# Patient Record
Sex: MALE | Race: WHITE | NOT HISPANIC OR LATINO | Employment: FULL TIME | URBAN - METROPOLITAN AREA
[De-identification: names, ages, dates, MRNs, and addresses within clinical notes are randomized per-mention and may not be internally consistent; named-entity substitution may affect disease eponyms.]

---

## 2019-07-17 ENCOUNTER — ANESTHESIA (INPATIENT)
Dept: PERIOP | Facility: HOSPITAL | Age: 29
DRG: 395 | End: 2019-07-17

## 2019-07-17 ENCOUNTER — APPOINTMENT (EMERGENCY)
Dept: CT IMAGING | Facility: HOSPITAL | Age: 29
DRG: 395 | End: 2019-07-17

## 2019-07-17 ENCOUNTER — ANESTHESIA EVENT (INPATIENT)
Dept: PERIOP | Facility: HOSPITAL | Age: 29
DRG: 395 | End: 2019-07-17

## 2019-07-17 ENCOUNTER — HOSPITAL ENCOUNTER (INPATIENT)
Facility: HOSPITAL | Age: 29
LOS: 1 days | Discharge: HOME | DRG: 395 | End: 2019-07-18
Attending: EMERGENCY MEDICINE | Admitting: SURGERY

## 2019-07-17 DIAGNOSIS — K61.0 PERIANAL ABSCESS: ICD-10-CM

## 2019-07-17 DIAGNOSIS — K61.1 PERIRECTAL ABSCESS: Primary | ICD-10-CM

## 2019-07-17 DIAGNOSIS — K60.4 RECTAL FISTULA: ICD-10-CM

## 2019-07-17 LAB
ANION GAP SERPL CALCULATED.3IONS-SCNC: 7 MMOL/L (ref 4–13)
APTT PPP: 33 SECONDS (ref 23–37)
BASOPHILS # BLD AUTO: 0.03 THOUSANDS/ΜL (ref 0–0.1)
BASOPHILS NFR BLD AUTO: 0 % (ref 0–1)
BUN SERPL-MCNC: 9 MG/DL (ref 5–25)
CALCIUM SERPL-MCNC: 9.2 MG/DL (ref 8.3–10.1)
CHLORIDE SERPL-SCNC: 103 MMOL/L (ref 100–108)
CO2 SERPL-SCNC: 29 MMOL/L (ref 21–32)
CREAT SERPL-MCNC: 0.9 MG/DL (ref 0.6–1.3)
EOSINOPHIL # BLD AUTO: 0.15 THOUSAND/ΜL (ref 0–0.61)
EOSINOPHIL NFR BLD AUTO: 2 % (ref 0–6)
ERYTHROCYTE [DISTWIDTH] IN BLOOD BY AUTOMATED COUNT: 11.8 % (ref 11.6–15.1)
GFR SERPL CREATININE-BSD FRML MDRD: 115 ML/MIN/1.73SQ M
GLUCOSE SERPL-MCNC: 90 MG/DL (ref 65–140)
HCT VFR BLD AUTO: 42.7 % (ref 36.5–49.3)
HGB BLD-MCNC: 14.5 G/DL (ref 12–17)
IMM GRANULOCYTES # BLD AUTO: 0.04 THOUSAND/UL (ref 0–0.2)
IMM GRANULOCYTES NFR BLD AUTO: 0 % (ref 0–2)
INR PPP: 1.07 (ref 0.84–1.19)
LYMPHOCYTES # BLD AUTO: 1.49 THOUSANDS/ΜL (ref 0.6–4.47)
LYMPHOCYTES NFR BLD AUTO: 16 % (ref 14–44)
MCH RBC QN AUTO: 31 PG (ref 26.8–34.3)
MCHC RBC AUTO-ENTMCNC: 34 G/DL (ref 31.4–37.4)
MCV RBC AUTO: 91 FL (ref 82–98)
MONOCYTES # BLD AUTO: 0.91 THOUSAND/ΜL (ref 0.17–1.22)
MONOCYTES NFR BLD AUTO: 10 % (ref 4–12)
NEUTROPHILS # BLD AUTO: 6.58 THOUSANDS/ΜL (ref 1.85–7.62)
NEUTS SEG NFR BLD AUTO: 72 % (ref 43–75)
NRBC BLD AUTO-RTO: 0 /100 WBCS
PLATELET # BLD AUTO: 243 THOUSANDS/UL (ref 149–390)
PMV BLD AUTO: 9.5 FL (ref 8.9–12.7)
POTASSIUM SERPL-SCNC: 4.2 MMOL/L (ref 3.5–5.3)
PROTHROMBIN TIME: 13.9 SECONDS (ref 11.6–14.5)
RBC # BLD AUTO: 4.67 MILLION/UL (ref 3.88–5.62)
SODIUM SERPL-SCNC: 139 MMOL/L (ref 136–145)
WBC # BLD AUTO: 9.2 THOUSAND/UL (ref 4.31–10.16)

## 2019-07-17 PROCEDURE — 46040 I&D ISCHIORCT&/PERIRCT ABSC: CPT | Performed by: SURGERY

## 2019-07-17 PROCEDURE — 87070 CULTURE OTHR SPECIMN AEROBIC: CPT | Performed by: SURGERY

## 2019-07-17 PROCEDURE — 87176 TISSUE HOMOGENIZATION CULTR: CPT | Performed by: SURGERY

## 2019-07-17 PROCEDURE — 99222 1ST HOSP IP/OBS MODERATE 55: CPT | Performed by: SURGERY

## 2019-07-17 PROCEDURE — 87075 CULTR BACTERIA EXCEPT BLOOD: CPT | Performed by: SURGERY

## 2019-07-17 PROCEDURE — 36415 COLL VENOUS BLD VENIPUNCTURE: CPT | Performed by: EMERGENCY MEDICINE

## 2019-07-17 PROCEDURE — 80048 BASIC METABOLIC PNL TOTAL CA: CPT | Performed by: EMERGENCY MEDICINE

## 2019-07-17 PROCEDURE — 96361 HYDRATE IV INFUSION ADD-ON: CPT

## 2019-07-17 PROCEDURE — 87205 SMEAR GRAM STAIN: CPT | Performed by: SURGERY

## 2019-07-17 PROCEDURE — 85730 THROMBOPLASTIN TIME PARTIAL: CPT | Performed by: EMERGENCY MEDICINE

## 2019-07-17 PROCEDURE — 46040 I&D ISCHIORCT&/PERIRCT ABSC: CPT | Performed by: PHYSICIAN ASSISTANT

## 2019-07-17 PROCEDURE — 99284 EMERGENCY DEPT VISIT MOD MDM: CPT

## 2019-07-17 PROCEDURE — 85610 PROTHROMBIN TIME: CPT | Performed by: EMERGENCY MEDICINE

## 2019-07-17 PROCEDURE — 96374 THER/PROPH/DIAG INJ IV PUSH: CPT

## 2019-07-17 PROCEDURE — 0J993ZX DRAINAGE OF BUTTOCK SUBCUTANEOUS TISSUE AND FASCIA, PERCUTANEOUS APPROACH, DIAGNOSTIC: ICD-10-PCS | Performed by: SURGERY

## 2019-07-17 PROCEDURE — 72193 CT PELVIS W/DYE: CPT

## 2019-07-17 PROCEDURE — 99285 EMERGENCY DEPT VISIT HI MDM: CPT | Performed by: EMERGENCY MEDICINE

## 2019-07-17 PROCEDURE — 85025 COMPLETE CBC W/AUTO DIFF WBC: CPT | Performed by: EMERGENCY MEDICINE

## 2019-07-17 RX ORDER — CEFAZOLIN SODIUM 1 G/3ML
INJECTION, POWDER, FOR SOLUTION INTRAMUSCULAR; INTRAVENOUS AS NEEDED
Status: DISCONTINUED | OUTPATIENT
Start: 2019-07-17 | End: 2019-07-17 | Stop reason: SURG

## 2019-07-17 RX ORDER — CEFAZOLIN SODIUM 2 G/50ML
2000 SOLUTION INTRAVENOUS EVERY 8 HOURS
Status: DISCONTINUED | OUTPATIENT
Start: 2019-07-17 | End: 2019-07-18

## 2019-07-17 RX ORDER — HYDROMORPHONE HCL/PF 1 MG/ML
0.5 SYRINGE (ML) INJECTION
Status: DISCONTINUED | OUTPATIENT
Start: 2019-07-17 | End: 2019-07-17 | Stop reason: HOSPADM

## 2019-07-17 RX ORDER — DEXAMETHASONE SODIUM PHOSPHATE 10 MG/ML
INJECTION, SOLUTION INTRAMUSCULAR; INTRAVENOUS AS NEEDED
Status: DISCONTINUED | OUTPATIENT
Start: 2019-07-17 | End: 2019-07-17 | Stop reason: SURG

## 2019-07-17 RX ORDER — METRONIDAZOLE 500 MG/1
500 TABLET ORAL EVERY 8 HOURS SCHEDULED
Status: DISCONTINUED | OUTPATIENT
Start: 2019-07-17 | End: 2019-07-18 | Stop reason: HOSPADM

## 2019-07-17 RX ORDER — FENTANYL CITRATE 50 UG/ML
INJECTION, SOLUTION INTRAMUSCULAR; INTRAVENOUS AS NEEDED
Status: DISCONTINUED | OUTPATIENT
Start: 2019-07-17 | End: 2019-07-17 | Stop reason: SURG

## 2019-07-17 RX ORDER — MAGNESIUM HYDROXIDE 1200 MG/15ML
LIQUID ORAL AS NEEDED
Status: DISCONTINUED | OUTPATIENT
Start: 2019-07-17 | End: 2019-07-17 | Stop reason: HOSPADM

## 2019-07-17 RX ORDER — ONDANSETRON 2 MG/ML
4 INJECTION INTRAMUSCULAR; INTRAVENOUS EVERY 6 HOURS PRN
Status: DISCONTINUED | OUTPATIENT
Start: 2019-07-17 | End: 2019-07-18 | Stop reason: HOSPADM

## 2019-07-17 RX ORDER — PROPOFOL 10 MG/ML
INJECTION, EMULSION INTRAVENOUS AS NEEDED
Status: DISCONTINUED | OUTPATIENT
Start: 2019-07-17 | End: 2019-07-17 | Stop reason: SURG

## 2019-07-17 RX ORDER — DOCUSATE SODIUM 100 MG/1
100 CAPSULE, LIQUID FILLED ORAL 2 TIMES DAILY
Status: DISCONTINUED | OUTPATIENT
Start: 2019-07-17 | End: 2019-07-18 | Stop reason: HOSPADM

## 2019-07-17 RX ORDER — FENTANYL CITRATE/PF 50 MCG/ML
50 SYRINGE (ML) INJECTION
Status: DISCONTINUED | OUTPATIENT
Start: 2019-07-17 | End: 2019-07-17 | Stop reason: HOSPADM

## 2019-07-17 RX ORDER — KETOROLAC TROMETHAMINE 30 MG/ML
15 INJECTION, SOLUTION INTRAMUSCULAR; INTRAVENOUS ONCE
Status: COMPLETED | OUTPATIENT
Start: 2019-07-17 | End: 2019-07-17

## 2019-07-17 RX ORDER — ONDANSETRON 2 MG/ML
INJECTION INTRAMUSCULAR; INTRAVENOUS AS NEEDED
Status: DISCONTINUED | OUTPATIENT
Start: 2019-07-17 | End: 2019-07-17 | Stop reason: SURG

## 2019-07-17 RX ORDER — METOCLOPRAMIDE HYDROCHLORIDE 5 MG/ML
10 INJECTION INTRAMUSCULAR; INTRAVENOUS ONCE AS NEEDED
Status: DISCONTINUED | OUTPATIENT
Start: 2019-07-17 | End: 2019-07-17 | Stop reason: HOSPADM

## 2019-07-17 RX ORDER — ONDANSETRON 2 MG/ML
4 INJECTION INTRAMUSCULAR; INTRAVENOUS ONCE AS NEEDED
Status: DISCONTINUED | OUTPATIENT
Start: 2019-07-17 | End: 2019-07-17 | Stop reason: HOSPADM

## 2019-07-17 RX ORDER — ACETAMINOPHEN 325 MG/1
650 TABLET ORAL EVERY 6 HOURS PRN
Status: DISCONTINUED | OUTPATIENT
Start: 2019-07-17 | End: 2019-07-17

## 2019-07-17 RX ORDER — SODIUM CHLORIDE 9 MG/ML
50 INJECTION, SOLUTION INTRAVENOUS CONTINUOUS
Status: DISCONTINUED | OUTPATIENT
Start: 2019-07-17 | End: 2019-07-18 | Stop reason: HOSPADM

## 2019-07-17 RX ORDER — SODIUM CHLORIDE 9 MG/ML
125 INJECTION, SOLUTION INTRAVENOUS CONTINUOUS
Status: DISCONTINUED | OUTPATIENT
Start: 2019-07-17 | End: 2019-07-18

## 2019-07-17 RX ORDER — OXYCODONE HYDROCHLORIDE AND ACETAMINOPHEN 5; 325 MG/1; MG/1
2 TABLET ORAL EVERY 6 HOURS PRN
Status: DISCONTINUED | OUTPATIENT
Start: 2019-07-17 | End: 2019-07-18

## 2019-07-17 RX ORDER — MIDAZOLAM HYDROCHLORIDE 1 MG/ML
INJECTION INTRAMUSCULAR; INTRAVENOUS AS NEEDED
Status: DISCONTINUED | OUTPATIENT
Start: 2019-07-17 | End: 2019-07-17 | Stop reason: SURG

## 2019-07-17 RX ORDER — ACETAMINOPHEN 325 MG/1
650 TABLET ORAL EVERY 6 HOURS PRN
Status: DISCONTINUED | OUTPATIENT
Start: 2019-07-17 | End: 2019-07-18 | Stop reason: HOSPADM

## 2019-07-17 RX ORDER — LIDOCAINE HYDROCHLORIDE 10 MG/ML
INJECTION, SOLUTION INFILTRATION; PERINEURAL AS NEEDED
Status: DISCONTINUED | OUTPATIENT
Start: 2019-07-17 | End: 2019-07-17 | Stop reason: SURG

## 2019-07-17 RX ORDER — OXYCODONE HYDROCHLORIDE AND ACETAMINOPHEN 5; 325 MG/1; MG/1
1 TABLET ORAL EVERY 4 HOURS PRN
Status: DISCONTINUED | OUTPATIENT
Start: 2019-07-17 | End: 2019-07-18 | Stop reason: HOSPADM

## 2019-07-17 RX ORDER — OXYCODONE HYDROCHLORIDE AND ACETAMINOPHEN 5; 325 MG/1; MG/1
1 TABLET ORAL EVERY 4 HOURS PRN
Status: DISCONTINUED | OUTPATIENT
Start: 2019-07-17 | End: 2019-07-17

## 2019-07-17 RX ADMIN — Medication 500 MG: at 14:41

## 2019-07-17 RX ADMIN — FENTANYL CITRATE 50 MCG: 50 INJECTION INTRAMUSCULAR; INTRAVENOUS at 15:49

## 2019-07-17 RX ADMIN — LIDOCAINE HYDROCHLORIDE 50 MG: 10 INJECTION, SOLUTION INFILTRATION; PERINEURAL at 14:39

## 2019-07-17 RX ADMIN — ONDANSETRON 4 MG: 2 INJECTION INTRAMUSCULAR; INTRAVENOUS at 14:45

## 2019-07-17 RX ADMIN — SODIUM CHLORIDE 125 ML/HR: 0.9 INJECTION, SOLUTION INTRAVENOUS at 13:47

## 2019-07-17 RX ADMIN — CEFAZOLIN SODIUM 2000 MG: 2 SOLUTION INTRAVENOUS at 22:07

## 2019-07-17 RX ADMIN — CEFAZOLIN 2000 MG: 1 INJECTION, POWDER, FOR SOLUTION INTRAVENOUS at 14:43

## 2019-07-17 RX ADMIN — MORPHINE SULFATE 2 MG: 2 INJECTION, SOLUTION INTRAMUSCULAR; INTRAVENOUS at 13:53

## 2019-07-17 RX ADMIN — SODIUM CHLORIDE 50 ML/HR: 0.9 INJECTION, SOLUTION INTRAVENOUS at 16:00

## 2019-07-17 RX ADMIN — PROPOFOL 300 MG: 10 INJECTION, EMULSION INTRAVENOUS at 14:39

## 2019-07-17 RX ADMIN — SODIUM CHLORIDE 1000 ML: 0.9 INJECTION, SOLUTION INTRAVENOUS at 09:24

## 2019-07-17 RX ADMIN — DEXAMETHASONE SODIUM PHOSPHATE 10 MG: 10 INJECTION, SOLUTION INTRAMUSCULAR; INTRAVENOUS at 14:45

## 2019-07-17 RX ADMIN — IOHEXOL 100 ML: 350 INJECTION, SOLUTION INTRAVENOUS at 10:21

## 2019-07-17 RX ADMIN — METRONIDAZOLE 500 MG: 500 TABLET, FILM COATED ORAL at 22:07

## 2019-07-17 RX ADMIN — FENTANYL CITRATE 100 MCG: 50 INJECTION, SOLUTION INTRAMUSCULAR; INTRAVENOUS at 14:47

## 2019-07-17 RX ADMIN — PROPOFOL 100 MG: 10 INJECTION, EMULSION INTRAVENOUS at 14:47

## 2019-07-17 RX ADMIN — KETOROLAC TROMETHAMINE 15 MG: 30 INJECTION, SOLUTION INTRAMUSCULAR at 09:24

## 2019-07-17 RX ADMIN — FENTANYL CITRATE 50 MCG: 50 INJECTION, SOLUTION INTRAMUSCULAR; INTRAVENOUS at 14:52

## 2019-07-17 RX ADMIN — MIDAZOLAM HYDROCHLORIDE 2 MG: 1 INJECTION, SOLUTION INTRAMUSCULAR; INTRAVENOUS at 14:33

## 2019-07-17 NOTE — ED PROVIDER NOTES
History  Chief Complaint   Patient presents with    Abscess     c/o abcess on buttocks     65-year-old male without significant past medical history for evaluation of right buttock pain  Patient reports that on Friday he developed pain in his right buttock since that time it has gotten progressively worse is nonradiating it is worse when he walks and sits he has not taken anything for this he denies other associated symptoms with this  He reports that he has had abscesses previously but never as bad as this or requiring intervention  Pain is localized to his right buttock only  He otherwise denies a history of IBD or constitutional symptoms fevers shaking chills headache chest pain cough shortness of breath nausea vomiting anorexia abdominal pain change in bowels or bladder diarrhea blood in his stool joint pain swelling rashes denies history of diabetes he has no other complaints or concerns otherwise denies a complete review systems as noted          None       Past Medical History:   Diagnosis Date    GERD (gastroesophageal reflux disease)        Past Surgical History:   Procedure Laterality Date    VASECTOMY         History reviewed  No pertinent family history  I have reviewed and agree with the history as documented  Social History     Tobacco Use    Smoking status: Never Smoker    Smokeless tobacco: Never Used   Substance Use Topics    Alcohol use: Never     Frequency: Never    Drug use: Never        Review of Systems   Constitutional: Negative for activity change, appetite change, chills and fever  Respiratory: Negative for cough and shortness of breath  Cardiovascular: Negative for chest pain and palpitations  Gastrointestinal: Positive for rectal pain  Negative for abdominal distention, abdominal pain, anal bleeding, blood in stool, diarrhea, nausea and vomiting  Endocrine: Negative for polydipsia and polyphagia     Genitourinary: Negative for dysuria, frequency, scrotal swelling, testicular pain and urgency  Musculoskeletal: Negative for arthralgias, back pain and myalgias  Skin: Negative for color change and rash  Neurological: Negative for dizziness, weakness, light-headedness and headaches  Hematological: Negative for adenopathy  Does not bruise/bleed easily  Psychiatric/Behavioral: Negative for agitation and behavioral problems  All other systems reviewed and are negative  Physical Exam  Physical Exam   Constitutional: He is oriented to person, place, and time  He appears well-developed and well-nourished  No distress  HENT:   Head: Normocephalic and atraumatic  Eyes: Pupils are equal, round, and reactive to light  EOM are normal    Neck: Normal range of motion  Neck supple  No tracheal deviation present  Cardiovascular: Normal rate, regular rhythm and normal heart sounds  Exam reveals no gallop and no friction rub  No murmur heard  Pulmonary/Chest: Effort normal and breath sounds normal  He has no wheezes  He has no rales  Abdominal: Soft  Bowel sounds are normal  He exhibits no distension  There is no tenderness  There is no rebound and no guarding  Genitourinary:   Genitourinary Comments: Patient's female nurse Steffanie العراقي was present for rectal exam he has approximately 4 cm fluctuant abscess lateral to his right anal verge he is tender in this area there is no surrounding erythema no fluctuance induration crepitus does not extend onto his perineum or scrotum there is no drainage otherwise unremarkable exam   Musculoskeletal: Normal range of motion  He exhibits no edema or tenderness  Neurological: He is alert and oriented to person, place, and time  No cranial nerve deficit  He exhibits normal muscle tone  Coordination normal    Skin: Skin is warm and dry  No rash noted  Psychiatric: He has a normal mood and affect  His behavior is normal    Nursing note and vitals reviewed        Vital Signs  ED Triage Vitals [07/17/19 0846]   Temperature Pulse Respirations Blood Pressure SpO2   98 °F (36 7 °C) 65 19 122/65 99 %      Temp Source Heart Rate Source Patient Position - Orthostatic VS BP Location FiO2 (%)   Oral Monitor -- Right arm --      Pain Score       6           Vitals:    07/17/19 0846 07/17/19 1413   BP: 122/65 127/68   Pulse: 65 (!) 50         Visual Acuity      ED Medications  Medications   sodium chloride 0 9 % infusion ( Intravenous Continue from Pre-op 7/17/19 1430)   docusate sodium (COLACE) capsule 100 mg ( Oral Dose Auto Held 7/20/19 1800)   ondansetron (ZOFRAN) injection 4 mg ( Intravenous MAR Hold 7/17/19 1409)   enoxaparin (LOVENOX) subcutaneous injection 40 mg ( Subcutaneous Dose Auto Held 7/20/19 0900)   acetaminophen (TYLENOL) tablet 650 mg ( Oral MAR Hold 7/17/19 1409)   witch hazel-glycerin (TUCKS) topical pad 1 pad ( Topical MAR Hold 7/17/19 1409)   morphine injection 2 mg ( Intravenous MAR Hold 7/17/19 1409)   oxyCODONE-acetaminophen (PERCOCET) 5-325 mg per tablet 1 tablet ( Oral MAR Hold 7/17/19 1409)   acetaminophen (TYLENOL) tablet 650 mg ( Oral MAR Hold 7/17/19 1409)   sodium chloride 0 9 % irrigation solution (1,000 mL Irrigation Given 7/17/19 1447)   sodium chloride 0 9 % bolus 1,000 mL (0 mL Intravenous Stopped 7/17/19 1024)   ketorolac (TORADOL) injection 15 mg (15 mg Intravenous Given 7/17/19 0924)   iohexol (OMNIPAQUE) 350 MG/ML injection (MULTI-DOSE) 100 mL (100 mL Intravenous Given 7/17/19 1021)       Diagnostic Studies  Results Reviewed     Procedure Component Value Units Date/Time    Platelet count [[id number]]     Lab Status:  No result Specimen:  Blood     Protime-INR [[id number]]  (Normal) Collected:  07/17/19 0924    Lab Status:  Final result Specimen:  Blood from Arm, Right Updated:  07/17/19 0950     Protime 13 9 seconds      INR 1 07    APTT [[id number]]  (Normal) Collected:  07/17/19 0924    Lab Status:  Final result Specimen:  Blood from Arm, Right Updated:  07/17/19 0950     PTT 33 seconds     Basic metabolic panel [[id number]] Collected:  07/17/19 0924    Lab Status:  Final result Specimen:  Blood from Arm, Right Updated:  07/17/19 0949     Sodium 139 mmol/L      Potassium 4 2 mmol/L      Chloride 103 mmol/L      CO2 29 mmol/L      ANION GAP 7 mmol/L      BUN 9 mg/dL      Creatinine 0 90 mg/dL      Glucose 90 mg/dL      Calcium 9 2 mg/dL      eGFR 115 ml/min/1 73sq m     Narrative:       [place] guidelines for Chronic Kidney Disease (CKD):     Stage 1 with normal or high GFR (GFR > 90 mL/min/1 73 square meters)    Stage 2 Mild CKD (GFR = 60-89 mL/min/1 73 square meters)    Stage 3A Moderate CKD (GFR = 45-59 mL/min/1 73 square meters)    Stage 3B Moderate CKD (GFR = 30-44 mL/min/1 73 square meters)    Stage 4 Severe CKD (GFR = 15-29 mL/min/1 73 square meters)    Stage 5 End Stage CKD (GFR <15 mL/min/1 73 square meters)  Note: GFR calculation is accurate only with a steady state creatinine    CBC and differential [[id number]] Collected:  07/17/19 0924    Lab Status:  Final result Specimen:  Blood from Arm, Right Updated:  07/17/19 0940     WBC 9 20 Thousand/uL      RBC 4 67 Million/uL      Hemoglobin 14 5 g/dL      Hematocrit 42 7 %      MCV 91 fL      MCH 31 0 pg      MCHC 34 0 g/dL      RDW 11 8 %      MPV 9 5 fL      Platelets 069 Thousands/uL      nRBC 0 /100 WBCs      Neutrophils Relative 72 %      Immat GRANS % 0 %      Lymphocytes Relative 16 %      Monocytes Relative 10 %      Eosinophils Relative 2 %      Basophils Relative 0 %      Neutrophils Absolute 6 58 Thousands/µL      Immature Grans Absolute 0 04 Thousand/uL      Lymphocytes Absolute 1 49 Thousands/µL      Monocytes Absolute 0 91 Thousand/µL      Eosinophils Absolute 0 15 Thousand/µL      Basophils Absolute 0 03 Thousands/µL                  CT pelvis w contrast   Final Result by Curtis Downey MD (07/17 1053)      Right-sided perianal fistula that tracks to the skin of the right gluteal cleft with associated inflammatory changes and a 1 9 x 1 x 2 6 cm rim-enhancing fluid collection (likely abscess) in the right gluteal cleft subdermal tissues  The exact origin of the fistula cannot be determined on CT  Please note that contrast-enhanced MRI better defines the extent and course of perianal fistulas  The study was marked in [facility] for immediate notification  Workstation performed: [id number]                    Procedures  Procedures       ED Course  ED Course as of Jul 17 1505   Wed Jul 17, 2019   1230 Patient with moderate to large size perianal abscess with fistula, clinically well, discussed with surgery, will evaluate for operative drainage patient agreeable currently in p o  MDM    Disposition  Final diagnoses:   Perianal abscess   Perirectal abscess   Rectal fistula     Time reflects when diagnosis was documented in both MDM as applicable and the Disposition within this note     Time User Action Codes Description Comment    7/17/2019 12:18 PM Yandy Child Add [K61 0] Perianal abscess     7/17/2019 12:31 PM Farideh Ghee W Modify [K61 0] Perianal abscess     7/17/2019 12:31 PM Farideh Ghee W Add [K61 1] Perirectal abscess     7/17/2019 12:31 PM Farideh Ghee W Add [K60 4] Rectal fistula     7/17/2019 12:32 PM Farideh Ghee W Modify [K61 0] Perianal abscess     7/17/2019 12:32 PM Farideh Ghee W Modify [K61 1] Perirectal abscess     7/17/2019 12:32 PM Farideh Ghee W Modify [K61 1] Perirectal abscess     7/17/2019 12:32 PM Ahmet Diallo Modify [K61 1] Perirectal abscess     7/17/2019  2:50 PM Nida Esteban Modify [K61 0] Perianal abscess       ED Disposition     ED Disposition Condition Date/Time Comment    Admit Stable Wed Jul 17, 2019 12:31 PM Case was discussed with Kati Quesada and the patient's admission status was agreed to be Admission Status: observation status to the service of Dr Jacquelyn Camara             Follow-up Information    None         There are no discharge medications for this patient  No discharge procedures on file      ED Provider  Electronically Signed by           Steve Whitlock DO  07/17/19 2926

## 2019-07-17 NOTE — H&P
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    -plan for OR I & D   -NPO  -IVF  -IV antibiotics  -pain control  -sitz bath  -warm compresses  -tucks for comfort         Chief Complaint I had an lump start on my my right butt 5 days ago  I tried using heat and topical antibiotic but it got bigger  HPI: Lazarus Sink is a 33y o  year old male who presents with alize-anal abscess x 5 days  Pt states that is developed as small lump 5 days ago  He tried using heat and over the counter antibiotic ointment but it did not help  It got bigger and much more painful  He denies f/c/n/v/d/c  It did not come to a head and did not drain      He denies mucus or bleeding per rectum  Bowel movements have been normal  He denies infectious or inflammatory bowel disease  This is the first alize-anal abscess, but had others small boils/abscesses on his body that did not require I & D or antibiotics years ago while in the [facility]  These were self limiting  CT Right-sided perianal fistula that tracks to the skin of the right gluteal cleft with associated inflammatory changes and a 1 9 x 1 x 2 6 cm rim-enhancing fluid collection (likely abscess) in the right gluteal cleft subdermal tissues    The exact origin of the fistula cannot be determined on CT  No leukocytosis       PMH GERD    Historical Information   Past Medical History:   Diagnosis Date    GERD (gastroesophageal reflux disease)      Past Surgical History:   Procedure Laterality Date    VASCULAR SURGERY       Social History   Social History     Substance and Sexual Activity   Alcohol Use Never    Frequency: Never     Social History     Substance and Sexual Activity   Drug Use Never     Social History     Tobacco Use   Smoking Status Never Smoker   Smokeless Tobacco Never Used Family History: no pertinent family history  No Known Allergies  Meds/Allergies   current meds:   No current facility-administered medications for this encounter  Objective   Vitals: Blood pressure 122/65, pulse 65, temperature 98 °F (36 7 °C), temperature source Oral, resp  rate 19, height 6' 4" (1 93 m), weight 102 kg (225 lb), SpO2 99 %  ,Body mass index is 27 39 kg/m²  No intake or output data in the 24 hours ending 07/17/19 1204  @LDASHORT    @ROS:  12 set ROS reviewed and negative except for:   Lump on right butt cheek for 5 days  Pain in left butt cheek        Physical Exam:    General appearance: alert, appears stated age and cooperative  HEENT: PERRLA, EOMI, sclera clear, anicterus, oral mucosa is moist   Back: no tenderness,deformity,   Lungs:clear throughout  Heart[de-identified] RRR, S1, S2 normal, no murmur  Abdomen: soft , NT, NBS  No masses, organomegaly       Rectum/Anus:  Teetee-anal abscess at anal verge of  right buttock induration and fluctuance measures 4 cm, no drainage  Area is tender  No crepitus  No tenderness of testicles or scrotum  There is erythema approx 4 cm associated with abscess         Extremities: FROM no joint deformities, motor,sensory intact,pedal edema none  Skin: see rectal   Neurologic: CN II-XII grossly intact, no tremor, affect appropriate    Lab Results:   CBC with diff:   Lab Results   Component Value Date    WBC 9 20 07/17/2019    HGB 14 5 07/17/2019    HCT 42 7 07/17/2019    MCV 91 07/17/2019     07/17/2019    MCH 31 0 07/17/2019    MCHC 34 0 07/17/2019    RDW 11 8 07/17/2019    MPV 9 5 07/17/2019    NRBC 0 07/17/2019   , BMP/CMP:   Lab Results   Component Value Date    SODIUM 139 07/17/2019    K 4 2 07/17/2019     07/17/2019    CO2 29 07/17/2019    BUN 9 07/17/2019    CREATININE 0 90 07/17/2019    CALCIUM 9 2 07/17/2019    EGFR 115 07/17/2019     Imaging Studies: Ct Pelvis W Contrast    Result Date: 7/17/2019  Impression: Right-sided perianal fistula that tracks to the skin of the right gluteal cleft with associated inflammatory changes and a 1 9 x 1 x 2 6 cm rim-enhancing fluid collection (likely abscess) in the right gluteal cleft subdermal tissues  The exact origin of the fistula cannot be determined on CT  Please note that contrast-enhanced MRI better defines the extent and course of perianal fistulas  The study was marked in [facility] for immediate notification   Workstation performed: [id number]       VTE Prophylaxis: Sequential compression device (Venodyne)    lovenox      Code Status:full code  Advance Directive and Living Will:      Power of :    POLST:      Franca Mishra PA-C  7/17/2019

## 2019-07-17 NOTE — ANESTHESIA PREPROCEDURE EVALUATION
Review of Systems/Medical History  Patient summary reviewed  Chart reviewed      Cardiovascular  Negative cardio ROS    Pulmonary  Negative pulmonary ROS        GI/Hepatic    GERD ,        Negative  ROS        Endo/Other  Negative endo/other ROS      GYN  Negative gynecology ROS          Hematology  Negative hematology ROS      Musculoskeletal       Neurology  Negative neurology ROS      Psychology   Negative psychology ROS              Physical Exam    Airway    Mallampati score: I  TM Distance: >3 FB  Neck ROM: full     Dental   No notable dental hx     Cardiovascular  Comment: Negative ROS, Rhythm: regular, Rate: normal, Cardiovascular exam normal    Pulmonary  Pulmonary exam normal Breath sounds clear to auscultation,     Other Findings        Anesthesia Plan  ASA Score- 2     Anesthesia Type- general with ASA Monitors  Additional Monitors:   Airway Plan: LMA  Plan Factors-    Induction- intravenous  Postoperative Plan- Plan for postoperative opioid use  Planned trial extubation    Informed Consent- Anesthetic plan and risks discussed with patient and spouse  I personally reviewed this patient with the CRNA  Discussed and agreed on the Anesthesia Plan with the CRNA  Sharri Arenas

## 2019-07-17 NOTE — ANESTHESIA POSTPROCEDURE EVALUATION
Post-Op Assessment Note    CV Status:  Stable  Pain Score: 0    Pain management: adequate     Mental Status:  Sleepy   Hydration Status:  Stable   PONV Controlled:  Controlled   Airway Patency:  Patent   Post Op Vitals Reviewed: Yes      Staff: CRNA           BP   104/50   Temp   97 3   Pulse 57   Resp   14   SpO2   99

## 2019-07-17 NOTE — PLAN OF CARE
Problem: PAIN - ADULT  Goal: Verbalizes/displays adequate comfort level or baseline comfort level  Description  Interventions:  - Encourage patient to monitor pain and request assistance  - Assess pain using appropriate pain scale  - Administer analgesics based on type and severity of pain and evaluate response  - Implement non-pharmacological measures as appropriate and evaluate response  - Consider cultural and social influences on pain and pain management  - Notify physician/advanced practitioner if interventions unsuccessful or patient reports new pain  Outcome: Progressing     Problem: INFECTION - ADULT  Goal: Absence or prevention of progression during hospitalization  Description  INTERVENTIONS:  - Assess and monitor for signs and symptoms of infection  - Monitor lab/diagnostic results  - Monitor all insertion sites, i e  indwelling lines, tubes, and drains  - Monitor endotracheal (as able) and nasal secretions for changes in amount and color  - [place] appropriate cooling/warming therapies per order  - Administer medications as ordered  - Instruct and encourage patient and family to use good hand hygiene technique  - Identify and instruct in appropriate isolation precautions for identified infection/condition  Outcome: Progressing  Goal: Absence of fever/infection during neutropenic period  Description  INTERVENTIONS:  - Monitor WBC  - Implement neutropenic guidelines  Outcome: Progressing     Problem: SAFETY ADULT  Goal: Patient will remain free of falls  Description  INTERVENTIONS:  - Assess patient frequently for physical needs  -  Identify cognitive and physical deficits and behaviors that affect risk of falls    -  [place] fall precautions as indicated by assessment   - Educate patient/family on patient safety including physical limitations  - Instruct patient to call for assistance with activity based on assessment  - Modify environment to reduce risk of injury  - Consider OT/PT consult to assist with strengthening/mobility  Outcome: Progressing  Goal: Maintain or return to baseline ADL function  Description  INTERVENTIONS:  -  Assess patient's ability to carry out ADLs; assess patient's baseline for ADL function and identify physical deficits which impact ability to perform ADLs (bathing, care of mouth/teeth, toileting, grooming, dressing, etc )  - Assess/evaluate cause of self-care deficits   - Assess range of motion  - Assess patient's mobility; develop plan if impaired  - Assess patient's need for assistive devices and provide as appropriate  - Encourage maximum independence but intervene and supervise when necessary  ¯ Involve family in performance of ADLs  ¯ Assess for home care needs following discharge   ¯ Request OT consult to assist with ADL evaluation and planning for discharge  ¯ Provide patient education as appropriate  Outcome: Progressing  Goal: Maintain or return mobility status to optimal level  Description  INTERVENTIONS:  - Assess patient's baseline mobility status (ambulation, transfers, stairs, etc )    - Identify cognitive and physical deficits and behaviors that affect mobility  - Identify mobility aids required to assist with transfers and/or ambulation (gait belt, sit-to-stand, lift, walker, cane, etc )  - [place] fall precautions as indicated by assessment  - Record patient progress and toleration of activity level on Mobility SBAR; progress patient to next Phase/Stage  - Instruct patient to call for assistance with activity based on assessment  - Request Rehabilitation consult to assist with strengthening/weightbearing, etc   Outcome: Progressing     Problem: DISCHARGE PLANNING  Goal: Discharge to home or other facility with appropriate resources  Description  INTERVENTIONS:  - Identify barriers to discharge w/patient and caregiver  - Arrange for needed discharge resources and transportation as appropriate  - Identify discharge learning needs (meds, wound care, etc )  - Arrange for interpretive services to assist at discharge as needed  - Refer to Case Management Department for coordinating discharge planning if the patient needs post-hospital services based on physician/advanced practitioner order or complex needs related to functional status, cognitive ability, or social support system  Outcome: Progressing     Problem: Knowledge Deficit  Goal: Patient/family/caregiver demonstrates understanding of disease process, treatment plan, medications, and discharge instructions  Description  Complete learning assessment and assess knowledge base    Interventions:  - Provide teaching at level of understanding  - Provide teaching via preferred learning methods  Outcome: Progressing

## 2019-07-17 NOTE — OP NOTE
OPERATIVE REPORT  PATIENT NAME: Freda Austin    :  1990  MRN: [id number]  Pt Location: [facility] OR ROOM 04    SURGERY DATE: 2019    Surgeon(s) and Role:     * Eric Alvarado MD - Primary    Assistant:  Tommy Harrison, [facility]    Preop Diagnosis:  Perianal abscess [K61 0]    Post-Op Diagnosis Codes:     * Perianal abscess [K61 0]    Procedure(s) (LRB):  INCISION AND DRAINAGE (I&D) PERIRECTAL (N/A), open wound packing    Specimen(s):  ID Type Source Tests Collected by Time Destination   A : Teetee-rectal Abscess Tissue Rectum ANAEROBIC CULTURE AND GRAM STAIN, CULTURE, TISSUE AND GRAM STAIN Eric Alvarado MD 2019 1447        Estimated Blood Loss:   Minimal    Drains:  None    Anesthesia Type:   General    Operative Indications:  Perianal abscess [K61 0]    Operative Findings:  5 cm right-sided perirectal abscess, multiloculated  Cultures taken for aerobic and anaerobic    Complications:   None    Procedure and Technique:  The patient was identified in the patient was placed in the operating table in a supine position  After adequate anesthesia induction and satisfactory LMA intubation he was repositioned in modified lithotomy  The perianal area was prepped and draped in sterile usual fashion with Betadine solution  Time-out was called the patient was identified as was surgical site  An elliptical incision was made over the fluctuant area on the right buttocks with a scalpel, taken down through the subcutaneous tissue with scalpel dissection, the abscess cavity was entered and cultures were taken for aerobic and anaerobic  Copious amount of purulent foul-smelling material was noted  At this point the loculations were breaking down with blunt finger dissection  Hemostasis was accomplished with cautery  The wound was copiously irrigated with saline solution and the wound was packed open with 1 inch packing  Sterile dressing was applied    At the end of the case instrument, needles, and sponges counts were correct  Patient tolerated the procedure well     I was present for the entire procedure, A qualified resident physician was not available and A physician assistant was required during the procedure for retraction tissue handling,dissection and suturing    Patient Disposition:  PACU , hemodynamically stable and extubated and stable    SIGNATURE: Bigg Bloom MD  DATE: July 17, 2019  TIME: 2:52 PM

## 2019-07-17 NOTE — LETTER
[address] [place] [address] [address] [id number]  No information on file  July 18, 2019     Patient: Huan Ojeda   YOB: 1990   Date of Visit: 7/17/2019       To Whom it May Concern:    Huan Ojeda is under my professional care  He was seen in the hospital from 7/17/2019   to 07/18/19  He may return to work on Wednesday, July 24, 2019 without limitations  If you have any questions or concerns, please don't hesitate to call    Dr Eugenio Su  [phone]         Sincerely,          Luz Solitario PA-C

## 2019-07-18 VITALS
TEMPERATURE: 98.2 F | SYSTOLIC BLOOD PRESSURE: 120 MMHG | BODY MASS INDEX: 27.4 KG/M2 | HEIGHT: 76 IN | HEART RATE: 54 BPM | OXYGEN SATURATION: 98 % | RESPIRATION RATE: 18 BRPM | DIASTOLIC BLOOD PRESSURE: 62 MMHG | WEIGHT: 225 LBS

## 2019-07-18 LAB
ANION GAP SERPL CALCULATED.3IONS-SCNC: 9 MMOL/L (ref 4–13)
BASOPHILS # BLD AUTO: 0.02 THOUSANDS/ΜL (ref 0–0.1)
BASOPHILS NFR BLD AUTO: 0 % (ref 0–1)
BUN SERPL-MCNC: 13 MG/DL (ref 5–25)
CALCIUM SERPL-MCNC: 9.1 MG/DL (ref 8.3–10.1)
CHLORIDE SERPL-SCNC: 103 MMOL/L (ref 100–108)
CO2 SERPL-SCNC: 26 MMOL/L (ref 21–32)
CREAT SERPL-MCNC: 0.72 MG/DL (ref 0.6–1.3)
EOSINOPHIL # BLD AUTO: 0 THOUSAND/ΜL (ref 0–0.61)
EOSINOPHIL NFR BLD AUTO: 0 % (ref 0–6)
ERYTHROCYTE [DISTWIDTH] IN BLOOD BY AUTOMATED COUNT: 11.5 % (ref 11.6–15.1)
GFR SERPL CREATININE-BSD FRML MDRD: 126 ML/MIN/1.73SQ M
GLUCOSE SERPL-MCNC: 108 MG/DL (ref 65–140)
HCT VFR BLD AUTO: 44 % (ref 36.5–49.3)
HGB BLD-MCNC: 15.1 G/DL (ref 12–17)
IMM GRANULOCYTES # BLD AUTO: 0.06 THOUSAND/UL (ref 0–0.2)
IMM GRANULOCYTES NFR BLD AUTO: 0 % (ref 0–2)
LYMPHOCYTES # BLD AUTO: 1.13 THOUSANDS/ΜL (ref 0.6–4.47)
LYMPHOCYTES NFR BLD AUTO: 8 % (ref 14–44)
MCH RBC QN AUTO: 31.1 PG (ref 26.8–34.3)
MCHC RBC AUTO-ENTMCNC: 34.3 G/DL (ref 31.4–37.4)
MCV RBC AUTO: 91 FL (ref 82–98)
MONOCYTES # BLD AUTO: 0.86 THOUSAND/ΜL (ref 0.17–1.22)
MONOCYTES NFR BLD AUTO: 6 % (ref 4–12)
NEUTROPHILS # BLD AUTO: 11.6 THOUSANDS/ΜL (ref 1.85–7.62)
NEUTS SEG NFR BLD AUTO: 86 % (ref 43–75)
NRBC BLD AUTO-RTO: 0 /100 WBCS
PLATELET # BLD AUTO: 260 THOUSANDS/UL (ref 149–390)
PMV BLD AUTO: 9.4 FL (ref 8.9–12.7)
POTASSIUM SERPL-SCNC: 4.2 MMOL/L (ref 3.5–5.3)
RBC # BLD AUTO: 4.86 MILLION/UL (ref 3.88–5.62)
SODIUM SERPL-SCNC: 138 MMOL/L (ref 136–145)
WBC # BLD AUTO: 13.67 THOUSAND/UL (ref 4.31–10.16)

## 2019-07-18 PROCEDURE — 85025 COMPLETE CBC W/AUTO DIFF WBC: CPT | Performed by: PHYSICIAN ASSISTANT

## 2019-07-18 PROCEDURE — 99024 POSTOP FOLLOW-UP VISIT: CPT | Performed by: SURGERY

## 2019-07-18 PROCEDURE — 80048 BASIC METABOLIC PNL TOTAL CA: CPT | Performed by: PHYSICIAN ASSISTANT

## 2019-07-18 PROCEDURE — NC001 PR NO CHARGE: Performed by: SURGERY

## 2019-07-18 RX ORDER — METRONIDAZOLE 500 MG/1
500 TABLET ORAL EVERY 8 HOURS SCHEDULED
Qty: 28 TABLET | Refills: 0 | Status: SHIPPED | OUTPATIENT
Start: 2019-07-18 | End: 2019-07-28

## 2019-07-18 RX ORDER — OXYCODONE HYDROCHLORIDE AND ACETAMINOPHEN 5; 325 MG/1; MG/1
2 TABLET ORAL EVERY 6 HOURS PRN
Status: DISCONTINUED | OUTPATIENT
Start: 2019-07-18 | End: 2019-07-18 | Stop reason: HOSPADM

## 2019-07-18 RX ORDER — OXYCODONE HYDROCHLORIDE AND ACETAMINOPHEN 5; 325 MG/1; MG/1
1 TABLET ORAL EVERY 4 HOURS PRN
Qty: 18 TABLET | Refills: 0 | Status: SHIPPED | OUTPATIENT
Start: 2019-07-18 | End: 2019-07-21

## 2019-07-18 RX ORDER — CEPHALEXIN 500 MG/1
500 CAPSULE ORAL EVERY 6 HOURS SCHEDULED
Status: DISCONTINUED | OUTPATIENT
Start: 2019-07-18 | End: 2019-07-18 | Stop reason: HOSPADM

## 2019-07-18 RX ORDER — CEPHALEXIN 500 MG/1
500 CAPSULE ORAL EVERY 6 HOURS SCHEDULED
Qty: 37 CAPSULE | Refills: 0 | Status: SHIPPED | OUTPATIENT
Start: 2019-07-18 | End: 2019-07-28

## 2019-07-18 RX ADMIN — CEFAZOLIN SODIUM 2000 MG: 2 SOLUTION INTRAVENOUS at 06:03

## 2019-07-18 RX ADMIN — ENOXAPARIN SODIUM 40 MG: 40 INJECTION SUBCUTANEOUS at 09:11

## 2019-07-18 RX ADMIN — CEPHALEXIN 500 MG: 500 CAPSULE ORAL at 12:47

## 2019-07-18 RX ADMIN — METRONIDAZOLE 500 MG: 500 TABLET, FILM COATED ORAL at 06:02

## 2019-07-18 RX ADMIN — DOCUSATE SODIUM 100 MG: 100 CAPSULE, LIQUID FILLED ORAL at 09:11

## 2019-07-18 RX ADMIN — OXYCODONE HYDROCHLORIDE AND ACETAMINOPHEN 1 TABLET: 5; 325 TABLET ORAL at 09:16

## 2019-07-18 NOTE — PROGRESS NOTES
Progress Note - General Surgery   Jennifer Lincoln 33 y o  male MRN: [id number]  Unit/Bed#: -01 Encounter: [id number]    Assessment/Plan  Jennifer Lincoln is a 33 y o  male     1  POD#1 s/p I&D of perineal abscess with placement of packing  Doing well post-operatively, marked improvement of pain, no fevers or chills    Continue current care  OK for discharge home  Packing to remain in place for another 24hr  Plan for removal of packing in shower tomorrow  Will have patient follow up with surgeon as outpatient in two weeks  OK for daily topical dressing changes after packing is removed  Pain contort PRN  Continue antibiotics  Will transition to oral antibiotics prior to discharge  Preliminary wound cultures showed gram positive cocci in pairs  Awaiting final results  Ambulation/OOB  DVT prophylaxis    Subjective/Objective     Subjective: No acute events overnight  Tolerating diet  States pain is significantly improved, only with mild soreness with movement  Able to ambulate  Passing flatus  Objective:     Blood pressure 120/62, pulse (!) 54, temperature 98 2 °F (36 8 °C), resp  rate 18, height 6' 4" (1 93 m), weight 102 kg (225 lb), SpO2 98 %  ,Body mass index is 27 39 kg/m²        Intake/Output Summary (Last 24 hours) at 7/18/2019 0906  Last data filed at 7/17/2019 2048  Gross per 24 hour   Intake 1960 ml   Output 430 ml   Net 1530 ml       Invasive Devices     Peripheral Intravenous Line            Peripheral IV 07/17/19 Right Antecubital less than 1 day                Physical Exam: /62   Pulse (!) 54   Temp 98 2 °F (36 8 °C)   Resp 18   Ht 6' 4" (1 93 m)   Wt 102 kg (225 lb)   SpO2 98%   BMI 27 39 kg/m²   General appearance: alert and oriented, in no acute distress  Lungs: clear to auscultation bilaterally  Heart: regular rate and rhythm, S1, S2 normal, no murmur, click, rub or gallop  Rectal: Perineal area with I&D incision, packing in place, surrounding erythema and edema improved, sore to palpation, minimal serosang drainage on overlying dressing  Extremities: extremities normal, warm and well-perfused; no cyanosis, clubbing, or edema    Lab, Imaging and other studies:I have personally reviewed pertinent lab results  VTE Pharmacologic Prophylaxis: Enoxaparin (Lovenox)  VTE Mechanical Prophylaxis: sequential compression device    Recent Results (from the past 36 hour(s))   CBC and differential    Collection Time: 07/17/19  9:24 AM   Result Value Ref Range    WBC 9 20 4  31 - 10 16 Thousand/uL    RBC 4 67 3 88 - 5 62 Million/uL    Hemoglobin 14 5 12 0 - 17 0 g/dL    Hematocrit 42 7 36 5 - 49 3 %    MCV 91 82 - 98 fL    MCH 31 0 26 8 - 34 3 pg    MCHC 34 0 31 4 - 37 4 g/dL    RDW 11 8 11 6 - 15 1 %    MPV 9 5 8 9 - 12 7 fL    Platelets 946 689 - 578 Thousands/uL    nRBC 0 /100 WBCs    Neutrophils Relative 72 43 - 75 %    Immat GRANS % 0 0 - 2 %    Lymphocytes Relative 16 14 - 44 %    Monocytes Relative 10 4 - 12 %    Eosinophils Relative 2 0 - 6 %    Basophils Relative 0 0 - 1 %    Neutrophils Absolute 6 58 1 85 - 7 62 Thousands/µL    Immature Grans Absolute 0 04 0 00 - 0 20 Thousand/uL    Lymphocytes Absolute 1 49 0 60 - 4 47 Thousands/µL    Monocytes Absolute 0 91 0 17 - 1 22 Thousand/µL    Eosinophils Absolute 0 15 0 00 - 0 61 Thousand/µL    Basophils Absolute 0 03 0 00 - 0 10 Thousands/µL   Protime-INR    Collection Time: 07/17/19  9:24 AM   Result Value Ref Range    Protime 13 9 11 6 - 14 5 seconds    INR 1 07 0 84 - 1 19   APTT    Collection Time: 07/17/19  9:24 AM   Result Value Ref Range    PTT 33 23 - 37 seconds   Basic metabolic panel    Collection Time: 07/17/19  9:24 AM   Result Value Ref Range    Sodium 139 136 - 145 mmol/L    Potassium 4 2 3 5 - 5 3 mmol/L    Chloride 103 100 - 108 mmol/L    CO2 29 21 - 32 mmol/L    ANION GAP 7 4 - 13 mmol/L    BUN 9 5 - 25 mg/dL    Creatinine 0 90 0 60 - 1 30 mg/dL    Glucose 90 65 - 140 mg/dL    Calcium 9 2 8 3 - 10 1 mg/dL    eGFR 115 ml/min/1 73sq m   Culture, tissue and Gram stain    Collection Time: 07/17/19  2:47 PM   Result Value Ref Range    Gram Stain Result 3+ Polys (A)     Gram Stain Result 1+ Gram positive cocci in pairs (A)    Basic metabolic panel    Collection Time: 07/18/19  5:52 AM   Result Value Ref Range    Sodium 138 136 - 145 mmol/L    Potassium 4 2 3 5 - 5 3 mmol/L    Chloride 103 100 - 108 mmol/L    CO2 26 21 - 32 mmol/L    ANION GAP 9 4 - 13 mmol/L    BUN 13 5 - 25 mg/dL    Creatinine 0 72 0 60 - 1 30 mg/dL    Glucose 108 65 - 140 mg/dL    Calcium 9 1 8 3 - 10 1 mg/dL    eGFR 126 ml/min/1 73sq m   CBC and differential    Collection Time: 07/18/19  5:52 AM   Result Value Ref Range    WBC 13 67 (H) 4 31 - 10 16 Thousand/uL    RBC 4 86 3 88 - 5 62 Million/uL    Hemoglobin 15 1 12 0 - 17 0 g/dL    Hematocrit 44 0 36 5 - 49 3 %    MCV 91 82 - 98 fL    MCH 31 1 26 8 - 34 3 pg    MCHC 34 3 31 4 - 37 4 g/dL    RDW 11 5 (L) 11 6 - 15 1 %    MPV 9 4 8 9 - 12 7 fL    Platelets 422 061 - 386 Thousands/uL    nRBC 0 /100 WBCs    Neutrophils Relative 86 (H) 43 - 75 %    Immat GRANS % 0 0 - 2 %    Lymphocytes Relative 8 (L) 14 - 44 %    Monocytes Relative 6 4 - 12 %    Eosinophils Relative 0 0 - 6 %    Basophils Relative 0 0 - 1 %    Neutrophils Absolute 11 60 (H) 1 85 - 7 62 Thousands/µL    Immature Grans Absolute 0 06 0 00 - 0 20 Thousand/uL    Lymphocytes Absolute 1 13 0 60 - 4 47 Thousands/µL    Monocytes Absolute 0 86 0 17 - 1 22 Thousand/µL    Eosinophils Absolute 0 00 0 00 - 0 61 Thousand/µL    Basophils Absolute 0 02 0 00 - 0 10 Thousands/µL

## 2019-07-18 NOTE — PHYSICIAN ADVISOR
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]  Perianal abscess [K61 0]  Perirectal abscess [K61 1]  Abscess [L02 91]     After review of the relevant documentation, labs, vital signs and test results, the patient was not appropriate for inpatient class  In this particular case the patient was admitted to the hospital as an inpatient  The patient however fails to satisfy the 2 midnight benchmark and closer scrutiny of the case is warranted  After review of the patient presentation and relevant labs the patient was most appropriate for observation/outpatient status on admission  Rationale is as follows: The patient is a 42-year-old male who presented to the emergency room on 07/17/2019 with a chief complaint of buttock abscess  On initial ER evaluation patient did not have leukocytosis or did not have symptoms suggesting sepsis or SIRS  CT of the pelvis with contrast revealed right-sided perianal fistula with tracking to the skin of the gluteal cleft with inflammatory changes and abscess  He was admitted for surgical evaluation and underwent incision and drainage of perirectal abscess  Patient was discharged home on 07/18/2019  Patient would be appropriate for lower level of care such as SPU      The patients vitals on arrival were ED Triage Vitals [07/17/19 0846]   Temperature Pulse Respirations Blood Pressure SpO2   98 °F (36 7 °C) 65 19 122/65 99 %      Temp Source Heart Rate Source Patient Position - Orthostatic VS BP Location FiO2 (%)   Oral Monitor -- Right arm --      Pain Score       6           Past Medical History:   Diagnosis Date    GERD (gastroesophageal reflux disease)      Past Surgical History:   Procedure Laterality Date    OR I&D PERIRECTAL ABSCESS N/A 7/17/2019    Procedure: INCISION AND DRAINAGE (I&D) PERIRECTAL;  Surgeon: Desi Howard MD;  Location: [facility];  Service: General    VASECTOMY             Consults have been placed to:   None    Vitals:    07/17/19 1830 07/17/19 1930 07/17/19 2030 07/17/19 2324   BP: 133/69 138/76 122/74 120/62   BP Location:       Pulse: 57 64 58 (!) 54   Resp:    18   Temp:    98 2 °F (36 8 °C)   TempSrc:       SpO2:    98%   Weight:       Height:           Most recent labs:    Recent Labs     07/17/19  0924 07/18/19  0552   WBC 9 20 13 67*   HGB 14 5 15 1   HCT 42 7 44 0    260   K 4 2 4 2   CALCIUM 9 2 9 1   BUN 9 13   CREATININE 0 90 0 72   INR 1 07  --        Scheduled Meds:  Continuous Infusions:  No current facility-administered medications for this encounter  PRN Meds:      Surgical procedures (if appropriate):  Procedure(s):  INCISION AND DRAINAGE (I&D) PERIRECTAL

## 2019-07-18 NOTE — PLAN OF CARE
Problem: PAIN - ADULT  Goal: Verbalizes/displays adequate comfort level or baseline comfort level  Description  Interventions:  - Encourage patient to monitor pain and request assistance  - Assess pain using appropriate pain scale  - Administer analgesics based on type and severity of pain and evaluate response  - Implement non-pharmacological measures as appropriate and evaluate response  - Consider cultural and social influences on pain and pain management  - Notify physician/advanced practitioner if interventions unsuccessful or patient reports new pain  Outcome: Progressing     Problem: INFECTION - ADULT  Goal: Absence or prevention of progression during hospitalization  Description  INTERVENTIONS:  - Assess and monitor for signs and symptoms of infection  - Monitor lab/diagnostic results  - Monitor all insertion sites, i e  indwelling lines, tubes, and drains  - Monitor endotracheal (as able) and nasal secretions for changes in amount and color  - Decker appropriate cooling/warming therapies per order  - Administer medications as ordered  - Instruct and encourage patient and family to use good hand hygiene technique  - Identify and instruct in appropriate isolation precautions for identified infection/condition  Outcome: Progressing  Goal: Absence of fever/infection during neutropenic period  Description  INTERVENTIONS:  - Monitor WBC  - Implement neutropenic guidelines  Outcome: Progressing     Problem: SAFETY ADULT  Goal: Patient will remain free of falls  Description  INTERVENTIONS:  - Assess patient frequently for physical needs  -  Identify cognitive and physical deficits and behaviors that affect risk of falls    -  Decker fall precautions as indicated by assessment   - Educate patient/family on patient safety including physical limitations  - Instruct patient to call for assistance with activity based on assessment  - Modify environment to reduce risk of injury  - Consider OT/PT consult to assist with strengthening/mobility  Outcome: Progressing  Goal: Maintain or return to baseline ADL function  Description  INTERVENTIONS:  -  Assess patient's ability to carry out ADLs; assess patient's baseline for ADL function and identify physical deficits which impact ability to perform ADLs (bathing, care of mouth/teeth, toileting, grooming, dressing, etc )  - Assess/evaluate cause of self-care deficits   - Assess range of motion  - Assess patient's mobility; develop plan if impaired  - Assess patient's need for assistive devices and provide as appropriate  - Encourage maximum independence but intervene and supervise when necessary  ¯ Involve family in performance of ADLs  ¯ Assess for home care needs following discharge   ¯ Request OT consult to assist with ADL evaluation and planning for discharge  ¯ Provide patient education as appropriate  Outcome: Progressing  Goal: Maintain or return mobility status to optimal level  Description  INTERVENTIONS:  - Assess patient's baseline mobility status (ambulation, transfers, stairs, etc )    - Identify cognitive and physical deficits and behaviors that affect mobility  - Identify mobility aids required to assist with transfers and/or ambulation (gait belt, sit-to-stand, lift, walker, cane, etc )  - Spring fall precautions as indicated by assessment  - Record patient progress and toleration of activity level on Mobility SBAR; progress patient to next Phase/Stage  - Instruct patient to call for assistance with activity based on assessment  - Request Rehabilitation consult to assist with strengthening/weightbearing, etc   Outcome: Progressing     Problem: DISCHARGE PLANNING  Goal: Discharge to home or other facility with appropriate resources  Description  INTERVENTIONS:  - Identify barriers to discharge w/patient and caregiver  - Arrange for needed discharge resources and transportation as appropriate  - Identify discharge learning needs (meds, wound care, etc )  - Arrange for interpretive services to assist at discharge as needed  - Refer to Case Management Department for coordinating discharge planning if the patient needs post-hospital services based on physician/advanced practitioner order or complex needs related to functional status, cognitive ability, or social support system  Outcome: Progressing     Problem: Knowledge Deficit  Goal: Patient/family/caregiver demonstrates understanding of disease process, treatment plan, medications, and discharge instructions  Description  Complete learning assessment and assess knowledge base    Interventions:  - Provide teaching at level of understanding  - Provide teaching via preferred learning methods  Outcome: Progressing

## 2019-07-18 NOTE — UTILIZATION REVIEW
Initial Clinical Review    Admission: Date/Time/Statement: 7/17/19 @ 1232     Orders Placed This Encounter   Procedures    Inpatient Admission     Standing Status:   Standing     Number of Occurrences:   1     Order Specific Question:   Admitting Physician     Answer:   Jcarlos Remy     Order Specific Question:   Level of Care     Answer:   Med Surg [16]     Order Specific Question:   Estimated length of stay     Answer:   Inpatient Only Surgery     ED Arrival Information     Expected Arrival Acuity Means of Arrival Escorted By Service Admission Type    - 7/17/2019 08:38 Urgent Walk-In Family Member Surgery-General Elective    Arrival Complaint    -        Chief Complaint   Patient presents with    Abscess     c/o abcess on buttocks     Assessment/Plan: 33y o  year old male who presents with alize-anal abscess x 5 days  Pt states that is developed as small lump 5 days ago  He tried using heat and over the counter antibiotic ointment but it did not help  It got bigger and much more painful  It did not come to a head and did not drain   This is his first alize-anal abscess, but had others small boils/abscesses on his body that did not require I & D or antibiotics years ago while in the [facility]  These were self limiting      CT Pelvis:  Right-sided perianal fistula that tracks to the skin of the right gluteal cleft with associated inflammatory changes and a 1 9 x 1 x 2 6 cm rim-enhancing fluid collection (likely abscess) in the right gluteal cleft subdermal tissues    The exact origin of the fistula cannot be determined on CT      EXAM:   Alize-anal abscess at anal verge of  right buttock induration and fluctuance measures 4 cm, no drainage  Area is tender  No crepitus  No tenderness of testicles or scrotum  There is erythema approx 4 cm associated with abscess  ASSESSMENT/PLAN:     Alize anal abscess/fistula that tracts to the skin, origin unknown     Will keep pt NPO, plan for OR I&D;  Give IVF AND IVAB;  Pain control; Warm compresses/sitz baths/Tucks for comfort      7/17  OP NOTE: INCISION AND DRAINAGE (I&D) PERIRECTAL, open wound packing  Anesthesia:  General   Operative Findings:   5 cm right-sided perirectal abscess, multiloculated  Cultures taken for aerobic and anaerobic    7/18    POD#1 s/p I&D of perineal abscess with placement of packing  Doing well post-operatively, marked improvement of pain, no fevers or chills   Packing to remain in place for another 24hr  Plan for removal of packing in shower tomorrow  Continue antibiotics  Will transition to oral antibiotics prior to discharge  Preliminary wound cultures showed gram positive cocci in pairs   Awaiting final results       ED Triage Vitals [07/17/19 0846]   Temperature Pulse Respirations Blood Pressure SpO2   98 °F (36 7 °C) 65 19 122/65 99 %      Temp Source Heart Rate Source Patient Position - Orthostatic VS BP Location FiO2 (%)   Oral Monitor -- Right arm --      Pain Score       6        Wt Readings from Last 1 Encounters:   07/17/19 102 kg (225 lb)     Additional Vital Signs:   07/17/19 1545  98 °F (36 7 °C)  50Abnormal   16  143/84   07/17/19 1530    58  16  139/78   07/17/19 1515    60  14  116/56   07/17/19 1508  97 3 °F (36 3 °C)Abnormal   54Abnormal   13  104/50   07/17/19 1413  98 4 °F (36 9 °C)  50Abnormal   20  127/68       Pertinent Labs/Diagnostic Test Results:   Results from last 7 days   Lab Units 07/18/19  0552 07/17/19  0924   WBC Thousand/uL 13 67* 9 20   HEMOGLOBIN g/dL 15 1 14 5   HEMATOCRIT % 44 0 42 7   PLATELETS Thousands/uL 260 243   NEUTROS ABS Thousands/µL 11 60* 6 58         Results from last 7 days   Lab Units 07/18/19  0552 07/17/19  0924   SODIUM mmol/L 138 139   POTASSIUM mmol/L 4 2 4 2   CHLORIDE mmol/L 103 103   CO2 mmol/L 26 29   ANION GAP mmol/L 9 7   BUN mg/dL 13 9   CREATININE mg/dL 0 72 0 90   EGFR ml/min/1 73sq m 126 115   CALCIUM mg/dL 9 1 9 2     Results from last 7 days   Lab Units 07/18/19  0552 07/17/19  0924   GLUCOSE RANDOM mg/dL 108 90     Results from last 7 days   Lab Units 07/17/19  0924   PROTIME seconds 13 9   INR  1 07   PTT seconds 33     Results from last 7 days   Lab Units 07/17/19  1447   GRAM STAIN RESULT  3+ Polys*  1+ Gram positive cocci in pairs*     ED Treatment:   Medication Administration from 07/17/2019 0838 to 07/17/2019 1409       Date/Time Order Dose Route Action     07/17/2019 0924 sodium chloride 0 9 % bolus 1,000 mL 1,000 mL Intravenous New Bag     07/17/2019 0924 ketorolac (TORADOL) injection 15 mg 15 mg Intravenous Given     07/17/2019 1347 sodium chloride 0 9 % infusion 125 mL/hr Intravenous New Bag     07/17/2019 1353 morphine injection 2 mg 2 mg Intravenous Given        Past Medical History:   Diagnosis Date    GERD (gastroesophageal reflux disease)      Admitting Diagnosis: 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]  Perianal abscess [K61 0]  Perirectal abscess [K61 1]  Abscess [L02 91]  Age/Sex: 33 y o  male  Admission Orders:  Routine postop care;  Leave drsg intact; Witch hazel TUCKS pad prn; IVF NS @  50 ;  IV ancef q 8;  lovenox sq daily;  SCD's;  Percocet po prn (given x1 postop)       Current Facility-Administered Medications:  acetaminophen 650 mg Oral Q6H PRN   cephalexin 500 mg Oral Q6H [address]   docusate sodium 100 mg Oral BID   enoxaparin 40 mg Subcutaneous Daily   metroNIDAZOLE 500 mg Oral Q8H [address]   morphine injection 2 mg Intravenous Q3H PRN   ondansetron 4 mg Intravenous Q6H PRN   oxyCODONE-acetaminophen 1 tablet Oral Q4H PRN   oxyCODONE-acetaminophen 2 tablet Oral Q6H PRN   sodium chloride 50 mL/hr Intravenous Continuous   witch hazel-glycerin 1 pad Topical Q4H PRN       Network Utilization Review Department  Phone: [phone]; Fax [phone]  [email or web address]  ATTENTION: Please call with any questions or concerns to [phone]  and carefully listen to the prompts so that you are directed to the right person     Send all requests for admission clinical reviews, approved or denied determinations and any other requests to fax [phone]   All voicemails are confidential

## 2019-07-18 NOTE — DISCHARGE SUMMARY
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]  Perianal abscess [K61 0]  Perirectal abscess [K61 1]  Abscess [L02 91]    Principle/ Secondary Diagnosis:  Past Medical History:   Diagnosis Date    GERD (gastroesophageal reflux disease)      Past Surgical History:   Procedure Laterality Date    PA I&D PERIRECTAL ABSCESS N/A 7/17/2019    Procedure: INCISION AND DRAINAGE (I&D) PERIRECTAL;  Surgeon: Erlin Ladd MD;  Location: [facility];  Service: General    VASECTOMY         Discharge Diagnoses:   Principal Problem:    Perianal abscess      Procedures Performed:  No orders of the defined types were placed in this encounter  INCISION AND DRAINAGE (I&D) PERIRECTAL (N/A Buttocks)  Procedure(s):  INCISION AND DRAINAGE (I&D) PERIRECTAL    Consultants: None      HPI:Korey Dover is a 33 y o  male who presented to the hospital with perineal abscess  Patient states he first noticed small lump approximately 5 days ago  He attempted to treat with warm compresses and over the counter antibiotic ointment, but he states it got much larger and more painful  CT of the area showed a right sided perineal fistula that tracked to the skin of the right gluteal cleft, with a 1 9 x 1 x 2 6 rim enhancing fluid collection indicative of an abscess  Summary of Hospital Course: Patient was admitted to the hospital and underwent an incision and drainage of the abscess site  Intraoperative findings showed a 5cm right sided perineal abscess, multiloculated  Cultures were obtained  The cavity was copiously irrigated with normal saline, and 1in packing was placed  The patient tolerated the procedure well and was transferred to PACU and subsequently the floor for monitoring  POD#1 patient was found to be doing well, pain was markedly improved   He was tolerating diet and having bowel function  He denied any further fevers or chills  Given findings, the patient was deemed appropriate for discharge home with instructions to follow up in two weeks  He was instructed to maintain the packing for another 24 hours before removing it at home in the shower  He was given a prescription of antibiotic to complete a 10 day course  He was given a prescription for pain control  All questions and concerns were answered prior to discharge  Significant Findings, Care, Treatment and Services Provided: See Above  Ct Pelvis W Contrast    Result Date: 7/17/2019  Impression: Right-sided perianal fistula that tracks to the skin of the right gluteal cleft with associated inflammatory changes and a 1 9 x 1 x 2 6 cm rim-enhancing fluid collection (likely abscess) in the right gluteal cleft subdermal tissues  The exact origin of the fistula cannot be determined on CT  Please note that contrast-enhanced MRI better defines the extent and course of perianal fistulas  The study was marked in [facility] for immediate notification  Workstation performed: [id number]     Complications: None    Discharge Diagnosis: Perineal abscess    Resolved Problems  Date Reviewed: 7/17/2019    None        Principal Problem:    Perianal abscess      Condition at Discharge: good         Discharge instructions/Information to patient and family:   See after visit summary for information provided to patient and family  Provisions for Follow-Up Care:  See after visit summary for information related to follow-up care and any pertinent home health orders  PCP: No primary care provider on file  Disposition: See After Visit Summary for discharge disposition information  Planned Readmission: No    Discharge Statement   I spent 30 minutes discharging the patient  This time was spent on the day of discharge  I had direct contact with the patient on the day of discharge   Additional documentation is required if more than 30 minutes were spent on discharge  Discharge Medications:  See after visit summary for reconciled discharge medications provided to patient and family

## 2019-07-18 NOTE — DISCHARGE INSTRUCTIONS
Wound Care: There is packing in place over the area of your I&D  Please leave the packing in place for a total of 48 hours  You may remove the packing two days after surgery (7/19)  Recommend removing the packing while in the shower  Allow warm water to soak and soften the packing prior to removal  There should only be one, continuous strip of packing in place  After packing removal, dry area and place dry gauze/dressing over area of surgical site  You do NOT need to repack the wound  Change the overlying dressing daily or more frequently if needed  You may shower daily and allow the wound to get wet  Do not scrub at site  Gently pat dry afterwards and place clean dressing on top  Follow up with the surgeon in two weeks for wound check  Please call the office sooner if you develop worsening pain or swelling, develop fevers, or notice increased discharge from the wound

## 2019-07-20 LAB
BACTERIA TISS AEROBE CULT: ABNORMAL
GRAM STN SPEC: ABNORMAL
GRAM STN SPEC: ABNORMAL

## 2019-07-21 LAB — BACTERIA SPEC ANAEROBE CULT: NORMAL

## (undated) DEVICE — GLOVE INDICATOR PI UNDERGLOVE SZ 7.5 BLUE

## (undated) DEVICE — DRAPE EQUIPMENT RF WAND

## (undated) DEVICE — LIGHT HANDLE COVER SLEEVE DISP BLUE STELLAR

## (undated) DEVICE — KERLIX BANDAGE ROLL: Brand: KERLIX

## (undated) DEVICE — INTENDED FOR TISSUE SEPARATION, AND OTHER PROCEDURES THAT REQUIRE A SHARP SURGICAL BLADE TO PUNCTURE OR CUT.: Brand: BARD-PARKER SAFETY BLADES SIZE 15, STERILE

## (undated) DEVICE — CURITY PLAIN PACKING STRIP: Brand: CURITY

## (undated) DEVICE — POOLE SUCTION HANDLE: Brand: CARDINAL HEALTH

## (undated) DEVICE — BETHLEHEM UNIVERSAL MINOR GEN: Brand: CARDINAL HEALTH

## (undated) DEVICE — GLOVE SRG BIOGEL ECLIPSE 7.5

## (undated) DEVICE — TUBING SUCTION 5MM X 12 FT